# Patient Record
(demographics unavailable — no encounter records)

---

## 2025-05-05 NOTE — ASSESSMENT
[FreeTextEntry1] : Mr. SHANTE CHAMBERS, 62 year old male, current cocaine user, w/ hx of asthma, seizures, IVDU (20 yrs ago), HTN, DM2. He presented to [facility] (2/6-2/11/25) with acute respiratory distress requiring intubation and treated with Narcan for possible methadone overdose. Workup imaging revealed large cavitary focus and c/f empyema vs TB. S/p IR drainage of pleural fluid + s. aureus (MSSA). Subsequently required chest tube placement on 02/12/25. He was later transferred to [facility] (2/13-3/17/25) for bronchopleural fistula requiring VATS procedure. Seen by CT Sx, pt not a surgical candidate, recommended IP Eval. Interventional pulm consulted, s/p OR 2/20/25 for flexible bronch w/ endobronchial valve placement w/ initial cessation of air leak. S/p 2nd Endobronchial valve placement by pulm on 2/27/25, but continued to have airleak. Repeat CXR is showing improvement of right PTX. Upon discharge, recommended to follow up outpatient.  Additionally, he was evaluated for Tb - QuantiFeron indeterminate, sputum culture x 3 on 2/9, 2/10, 2/11 all negative for acid fast bacilli. Culture of chest tube fluid negative for AFB. Pt was started on vancomycin + meropenem. Later switched meropenem to Zosyn for empyema. Sputum AFB noted to be positive mycobacterium avium, ID do not recommend treatment of MAC at this point. Discharged to [facility] on Augmentin for 4 weeks.  Presents today for follow up with CXR.  I have reviewed the patient's medical records and diagnostic images at time of this office consultation and have made the following recommendation: 1.   Recommendations reviewed with patient during this office visit, and all questions answered; Patient instructed on the importance of follow up and verbalizes understanding.

## 2025-05-05 NOTE — HISTORY OF PRESENT ILLNESS
[FreeTextEntry1] : Mr. SHANTE CHAMBERS, 62 year old male, current cocaine user, w/ hx of asthma, seizures, IVDU (20 yrs ago), HTN, DM2. He presented to [facility] (2/6-2/11/25) with acute respiratory distress requiring intubation and treated with Narcan for possible methadone overdose. Workup imaging revealed large cavitary focus and c/f empyema vs TB. S/p IR drainage of pleural fluid + s. aureus (MSSA). Subsequently required chest tube placement on 02/12/25. He was later transferred to [facility] (2/13-3/17/25) for bronchopleural fistula requiring VATS procedure. Seen by CT Sx, pt not a surgical candidate, recommended IP Eval. Interventional pulm consulted, s/p OR 2/20/25 for flexible bronch w/ endobronchial valve placement w/ initial cessation of air leak. S/p 2nd Endobronchial valve placement by pulm on 2/27/25, but continued to have airleak. Repeat CXR is showing improvement of right PTX. Upon discharge, recommended to follow up outpatient.   Additionally, he was evaluated for Tb - QuantiFeron indeterminate, sputum culture x 3 on 2/9, 2/10, 2/11 all negative for acid fast bacilli. Culture of chest tube fluid negative for AFB. Pt was started on vancomycin + meropenem. Later switched meropenem to Zosyn for empyema. Sputum AFB noted to be positive mycobacterium avium, ID do not recommend treatment of MAC at this point. Discharged to [facility] on Augmentin for 4 weeks.   CXR on 3/5/25: - Follow-up right loculated pneumothorax mildly decreased on most recent study.  CXR today--  Patient is here today for follow up.

## 2025-05-09 NOTE — ASSESSMENT
[Verbal] : Verbal [Written] : Written [Patient] : Patient [Good - alert, interested, motivated] : Good - alert, interested, motivated [Verbalizes knowledge/Understanding] : Verbalizes knowledge/understanding [Dressing changes] : dressing changes [Skin Care] : skin care [Pressure relief] : pressure relief [Signs and symptoms of infection] : sign and symptoms of infection [Nutrition] : nutrition [How and When to Call] : how and when to call [Patient responsibility to plan of care] : patient responsibility to plan of care [Stable] : stable [LTC] : LTC [Ambulatory] : Ambulatory [FreeTextEntry1] : Mr. SHANTE CHAMBERS, 62 year old male, current cocaine user, w/ hx of asthma, seizures, IVDU (20 yrs ago), HTN, DM2. He presented to [facility] (2/6-2/11/25) with acute respiratory distress requiring intubation and treated with Narcan for possible methadone overdose. Workup imaging revealed large cavitary focus and c/f empyema vs TB. S/p IR drainage of pleural fluid + s. aureus (MSSA). Subsequently required chest tube placement on 02/12/25. He was later transferred to [facility] (2/13-3/17/25) for bronchopleural fistula requiring VATS procedure. Seen by CT Sx, pt not a surgical candidate, recommended IP Eval. Interventional pulm consulted, s/p OR 2/20/25 for flexible bronch w/ endobronchial valve placement w/ initial cessation of air leak. S/p 2nd Endobronchial valve placement by pulm on 2/27/25, but continued to have airleak. Repeat CXR is showing improvement of right PTX. Upon discharge, recommended to follow up outpatient.  Additionally, he was evaluated for Tb - QuantiFeron indeterminate, sputum culture x 3 on 2/9, 2/10, 2/11 all negative for acid fast bacilli. Culture of chest tube fluid negative for AFB. Pt was started on vancomycin + meropenem. Later switched meropenem to Zosyn for empyema. Sputum AFB noted to be positive mycobacterium avium, ID do not recommend treatment of MAC at this point. Discharged to [facility] on Augmentin for 4 weeks.  Presents today for follow up with CXR.  I have reviewed the patient's medical records and diagnostic images at time of this office consultation and have made the following recommendation: 1.   Recommendations reviewed with patient during this office visit, and all questions answered; Patient instructed on the importance of follow up and verbalizes understanding.   5-7-25 Patient is seen for a consultation on a sacral pressure injury. Multiple areas of undermining noted.  S/P mechanical debridement.

## 2025-05-09 NOTE — REVIEW OF SYSTEMS
[Skin Wound] : skin wound [Negative] : Respiratory [FreeTextEntry9] : FROM [de-identified] : Sacral pressure injury.

## 2025-05-09 NOTE — PLAN
[FreeTextEntry1] : 5-7-25 Plan: S/P mechanical debridement. washed with Vashe.  Aqua Cell/Foam. Integra Study discussed with the patient in detail. Follow-up in one week.

## 2025-05-09 NOTE — HISTORY OF PRESENT ILLNESS
[FreeTextEntry1] : Mr. SHANTE CHAMBERS, 62 year old male, prior cocaine user, w/ hx of asthma, seizures, IVDU (20 yrs ago), HTN, DM2. He presented to [facility] (2/6-2/11/25) with acute respiratory distress requiring intubation and treated with Narcan for possible methadone overdose. Workup imaging revealed large cavitary focus and c/f empyema vs TB. S/p IR drainage of pleural fluid + s. aureus (MSSA). Subsequently required chest tube placement on 02/12/25. He was later transferred to [facility] (2/13-3/17/25) for bronchopleural fistula requiring VATS procedure. Seen by CT Sx, pt not a surgical candidate, recommended IP Eval. Interventional pulm consulted, s/p OR 2/20/25 for flexible bronch w/ endobronchial valve placement w/ initial cessation of air leak. S/p 2nd Endobronchial valve placement by pulm on 2/27/25, but continued to have airleak. Repeat CXR is showing improvement of right PTX. Upon discharge, recommended to follow up outpatient. Patient denies taking illegal drugs at this time.  Additionally, he was evaluated for Tb - QuantiFeron indeterminate, sputum culture x 3 on 2/9, 2/10, 2/11 all negative for acid fast bacilli. Culture of chest tube fluid negative for AFB. Pt was started on vancomycin + meropenem. Later switched meropenem to Zosyn for empyema. Sputum AFB noted to be positive mycobacterium avium, ID do not recommend treatment of MAC at this point. Discharged to [facility] on Augmentin for 4 weeks.   CXR on 3/5/25: - Follow-up right loculated pneumothorax mildly decreased on most recent study.  CXR today--  Patient is here today for follow up.   5-7-25 Patient is seen for consultation on a Sacral pressure injury. See CTX note. Elvis chills, fever.

## 2025-05-13 NOTE — HISTORY OF PRESENT ILLNESS
[FreeTextEntry1] : Mr. SHANTE CHAMBERS, 62 year old male, current cocaine user, w/ hx of asthma, seizures, IVDU (20 yrs ago), HTN, DM2. He presented to [facility] (2/6-2/11/25) with acute respiratory distress requiring intubation and treated with Narcan for possible methadone overdose. Workup imaging revealed large cavitary focus and c/f empyema vs TB. S/p IR drainage of pleural fluid + s. aureus (MSSA). Subsequently required chest tube placement on 02/12/25. He was later transferred to [facility] (2/13-3/17/25) for bronchopleural fistula requiring VATS procedure. Seen by CT Sx, pt not a surgical candidate, recommended IP Eval. Interventional pulm consulted, s/p OR 2/20/25 for flexible bronch w/ endobronchial valve placement w/ initial cessation of air leak. S/p 2nd Endobronchial valve placement by pulm on 2/27/25, but continued to have airleak. Repeat CXR is showing improvement of right PTX. Upon discharge, recommended to follow up outpatient.   Additionally, he was evaluated for Tb - QuantiFeron indeterminate, sputum culture x 3 on 2/9, 2/10, 2/11 all negative for acid fast bacilli. Culture of chest tube fluid negative for AFB. Pt was started on vancomycin + meropenem. Later switched meropenem to Zosyn for empyema. Sputum AFB noted to be positive mycobacterium avium, ID do not recommend treatment of MAC at this point. Discharged to [facility] on Augmentin for 4 weeks.   CXR on 3/5/25: - Follow-up right loculated pneumothorax mildly decreased on most recent study.  CXR today--  Patient is here today for follow up.

## 2025-05-13 NOTE — ASSESSMENT
[FreeTextEntry1] : Mr. SHANTE CHAMBERS, 62 year old male, current cocaine user, w/ hx of asthma, seizures, IVDU (20 yrs ago), HTN, DM2. He presented to [facility] (2/6-2/11/25) with acute respiratory distress requiring intubation and treated with Narcan for possible methadone overdose. Workup imaging revealed large cavitary focus and c/f empyema vs TB. S/p IR drainage of pleural fluid + s. aureus (MSSA). Subsequently required chest tube placement on 02/12/25. He was later transferred to [facility] (2/13-3/17/25) for bronchopleural fistula requiring VATS procedure. Seen by CT Sx, pt not a surgical candidate, recommended IP Eval. Interventional pulm consulted, s/p OR 2/20/25 for flexible bronch w/ endobronchial valve placement w/ initial cessation of air leak. S/p 2nd Endobronchial valve placement by pulm on 2/27/25, but continued to have airleak. Repeat CXR is showing improvement of right PTX. Upon discharge, recommended to follow up outpatient.  Additionally, he was evaluated for Tb - QuantiFeron indeterminate, sputum culture x 3 on 2/9, 2/10, 2/11 all negative for acid fast bacilli. Culture of chest tube fluid negative for AFB. Pt was started on vancomycin + meropenem. Later switched meropenem to Zosyn for empyema. Sputum AFB noted to be positive mycobacterium avium, ID do not recommend treatment of MAC at this point. Discharged to [facility] on Augmentin for 4 weeks.  Presents today for follow up with CXR.  I have reviewed the patient's medical records and diagnostic images at time of this office consultation and have made the following recommendation: 1.   Recommendations reviewed with patient during this office visit, and all questions answered; Patient instructed on the importance of follow up and verbalizes understanding.

## 2025-07-11 NOTE — ASSESSMENT
[FreeTextEntry1] : Mr. SHANTE CHAMBERS, 62 year old male, current cocaine user, w/ hx of asthma, seizures, IVDU (20 yrs ago), HTN, DM2. He presented to [facility] (2/6-2/11/25) with acute respiratory distress requiring intubation and treated with Narcan for possible methadone overdose. Workup imaging revealed large cavitary focus and c/f empyema vs TB. S/p IR drainage of pleural fluid + s. aureus (MSSA). Subsequently required chest tube placement on 02/12/25. He was later transferred to [facility] (2/13-3/17/25) for bronchopleural fistula requiring VATS procedure. Seen by CT Sx, pt not a surgical candidate, recommended IP Eval. Interventional pulm consulted, s/p OR 2/20/25 for flexible bronch w/ endobronchial valve placement w/ initial cessation of air leak. S/p 2nd Endobronchial valve placement by pulm on 2/27/25, but continued to have airleak. Repeat CXR is showing improvement of right PTX. Upon discharge, recommended to follow up outpatient.  Additionally, he was evaluated for Tb - QuantiFeron indeterminate, sputum culture x 3 on 2/9, 2/10, 2/11 all negative for acid fast bacilli. Culture of chest tube fluid negative for AFB. Pt was started on vancomycin + meropenem. Later switched meropenem to Zosyn for empyema. Sputum AFB noted to be positive mycobacterium avium, ID do not recommend treatment of MAC at this point. Discharged to [facility] on Augmentin for 4 weeks.  Presents today for follow up with CXR.  I have reviewed the patient's medical records and diagnostic images at time of this office consultation and have made the following recommendation: 1.   Recommendations reviewed with patient during this office visit, and all questions answered; Patient instructed on the importance of follow up and verbalizes understanding.

## 2025-07-11 NOTE — HISTORY OF PRESENT ILLNESS
[FreeTextEntry1] : Mr. SHANTE CHAMBERS, 62 year old male, current cocaine user, w/ hx of asthma, seizures, IVDU (20 yrs ago), HTN, DM2. He presented to [facility] (2/6-2/11/25) with acute respiratory distress requiring intubation and treated with Narcan for possible methadone overdose. Workup imaging revealed large cavitary focus and c/f empyema vs TB. S/p IR drainage of pleural fluid + s. aureus (MSSA). Subsequently required chest tube placement on 02/12/25. He was later transferred to [facility] (2/13-3/17/25) for bronchopleural fistula requiring VATS procedure. Seen by CT Sx, pt not a surgical candidate, recommended IP Eval. Interventional pulm consulted, s/p OR 2/20/25 for flexible bronch w/ endobronchial valve placement w/ initial cessation of air leak. S/p 2nd Endobronchial valve placement by pulm on 2/27/25, but continued to have airleak. Repeat CXR is showing improvement of right PTX. Upon discharge, recommended to follow up outpatient.   Additionally, he was evaluated for Tb - QuantiFeron indeterminate, sputum culture x 3 on 2/9, 2/10, 2/11 all negative for acid fast bacilli. Culture of chest tube fluid negative for AFB. Pt was started on vancomycin + meropenem. Later switched meropenem to Zosyn for empyema. Sputum AFB noted to be positive mycobacterium avium, ID do not recommend treatment of MAC at this point. Discharged to [facility] on Augmentin for 4 weeks.   CXR on 3/5/25: - Follow-up right loculated pneumothorax mildly decreased on most recent study.  CXR today--  Patient is here today for follow up.